# Patient Record
Sex: MALE | Race: WHITE | Employment: FULL TIME | URBAN - METROPOLITAN AREA
[De-identification: names, ages, dates, MRNs, and addresses within clinical notes are randomized per-mention and may not be internally consistent; named-entity substitution may affect disease eponyms.]

---

## 2018-09-26 ENCOUNTER — OP HISTORICAL/CONVERTED ENCOUNTER (OUTPATIENT)
Dept: OTHER | Age: 23
End: 2018-09-26

## 2018-09-29 ENCOUNTER — ED HISTORICAL/CONVERTED ENCOUNTER (OUTPATIENT)
Dept: OTHER | Age: 23
End: 2018-09-29

## 2019-02-16 ENCOUNTER — ED HISTORICAL/CONVERTED ENCOUNTER (OUTPATIENT)
Dept: OTHER | Age: 24
End: 2019-02-16

## 2021-09-20 ENCOUNTER — HOSPITAL ENCOUNTER (EMERGENCY)
Age: 26
Discharge: LEFT BEFORE DISCHARGE | End: 2021-09-20

## 2021-09-20 VITALS
RESPIRATION RATE: 18 BRPM | TEMPERATURE: 98.3 F | DIASTOLIC BLOOD PRESSURE: 83 MMHG | WEIGHT: 150 LBS | HEIGHT: 70 IN | HEART RATE: 81 BPM | SYSTOLIC BLOOD PRESSURE: 131 MMHG | BODY MASS INDEX: 21.47 KG/M2 | OXYGEN SATURATION: 99 %

## 2021-09-20 PROCEDURE — 75810000275 HC EMERGENCY DEPT VISIT NO LEVEL OF CARE

## 2021-09-20 RX ORDER — DIVALPROEX SODIUM 500 MG/1
500 TABLET, DELAYED RELEASE ORAL 3 TIMES DAILY
COMMUNITY

## 2021-09-20 RX ORDER — OXCARBAZEPINE 600 MG/1
600 TABLET ORAL DAILY
COMMUNITY

## 2021-09-20 NOTE — LETTER
[facility] EMERGENCY DEPARTMENT  [address] [facility] [address]  [phone]    Work/School Note    Date: 9/20/2021    To Whom It May concern:    Dimas Reza was at the emergency room by the following provider(s):  No providers found.           Sincerely,          Siva Hall RN

## 2021-09-20 NOTE — ED TRIAGE NOTES
Pt has hx of epilepsy. Hasn't taken meds for two days as he is waiting for meds to arrive in mail. Believes is vision problems is due to not taking seizure medication (Oxtellar). Pt c/o visual changes in both eyes. States he lost vision at work and now vision is blurry.

## 2022-05-31 ENCOUNTER — HOSPITAL ENCOUNTER (EMERGENCY)
Age: 27
Discharge: HOME | End: 2022-05-31
Attending: EMERGENCY MEDICINE
Payer: OTHER MISCELLANEOUS

## 2022-05-31 VITALS
HEIGHT: 70 IN | SYSTOLIC BLOOD PRESSURE: 119 MMHG | BODY MASS INDEX: 22.48 KG/M2 | WEIGHT: 157 LBS | DIASTOLIC BLOOD PRESSURE: 68 MMHG | HEART RATE: 68 BPM | RESPIRATION RATE: 19 BRPM | TEMPERATURE: 98.4 F | OXYGEN SATURATION: 98 %

## 2022-05-31 DIAGNOSIS — E86.0 DEHYDRATION: ICD-10-CM

## 2022-05-31 DIAGNOSIS — R55 NEAR SYNCOPE: Primary | ICD-10-CM

## 2022-05-31 LAB
ALBUMIN SERPL-MCNC: 3.3 G/DL (ref 3.5–5)
ALBUMIN/GLOB SERPL: 0.8 {RATIO} (ref 1.1–2.2)
ALP SERPL-CCNC: 68 U/L (ref 45–117)
ALT SERPL-CCNC: 14 U/L (ref 12–78)
ANION GAP SERPL CALC-SCNC: 8 MMOL/L (ref 5–15)
APPEARANCE UR: CLEAR
AST SERPL W P-5'-P-CCNC: 24 U/L (ref 15–37)
BASOPHILS # BLD: 0.1 K/UL (ref 0–0.2)
BASOPHILS NFR BLD: 1 % (ref 0–2.5)
BILIRUB SERPL-MCNC: 0.4 MG/DL (ref 0.2–1)
BILIRUB UR QL: NEGATIVE
BUN SERPL-MCNC: 13 MG/DL (ref 6–20)
BUN/CREAT SERPL: 19 (ref 12–20)
CA-I BLD-MCNC: 8.2 MG/DL (ref 8.5–10.1)
CHLORIDE SERPL-SCNC: 105 MMOL/L (ref 97–108)
CO2 SERPL-SCNC: 24 MMOL/L (ref 21–32)
COLOR UR: ABNORMAL
CREAT SERPL-MCNC: 0.69 MG/DL (ref 0.7–1.3)
EOSINOPHIL # BLD: 0.2 K/UL (ref 0–0.7)
EOSINOPHIL NFR BLD: 2 % (ref 0.9–2.9)
ERYTHROCYTE [DISTWIDTH] IN BLOOD BY AUTOMATED COUNT: 14 % (ref 11.5–14.5)
GLOBULIN SER CALC-MCNC: 3.9 G/DL (ref 2–4)
GLUCOSE SERPL-MCNC: 88 MG/DL (ref 65–100)
GLUCOSE UR STRIP.AUTO-MCNC: NEGATIVE MG/DL
HCT VFR BLD AUTO: 40.6 % (ref 41–53)
HGB BLD-MCNC: 13.5 G/DL (ref 13.5–17.5)
HGB UR QL STRIP: NEGATIVE
KETONES UR QL STRIP.AUTO: ABNORMAL MG/DL
LEUKOCYTE ESTERASE UR QL STRIP.AUTO: NEGATIVE
LYMPHOCYTES # BLD: 1.6 K/UL (ref 1–4.8)
LYMPHOCYTES NFR BLD: 15 % (ref 20.5–51.1)
MAGNESIUM SERPL-MCNC: 2 MG/DL (ref 1.6–2.4)
MCH RBC QN AUTO: 31.5 PG (ref 31–34)
MCHC RBC AUTO-ENTMCNC: 33.4 G/DL (ref 31–36)
MCV RBC AUTO: 94.4 FL (ref 80–100)
MONOCYTES # BLD: 0.6 K/UL (ref 0.2–2.4)
MONOCYTES NFR BLD: 6 % (ref 1.7–9.3)
NEUTS SEG # BLD: 7.9 K/UL (ref 1.8–7.7)
NEUTS SEG NFR BLD: 76 % (ref 42–75)
NITRITE UR QL STRIP.AUTO: NEGATIVE
NRBC # BLD: 0.01 K/UL
NRBC BLD-RTO: 0.1 PER 100 WBC
PH UR STRIP: 7.5 [PH] (ref 5–8)
PLATELET # BLD AUTO: 272 K/UL (ref 150–400)
PMV BLD AUTO: 9.9 FL (ref 6.5–11.5)
POTASSIUM SERPL-SCNC: 4.2 MMOL/L (ref 3.5–5.1)
PROT SERPL-MCNC: 7.2 G/DL (ref 6.4–8.2)
PROT UR STRIP-MCNC: NEGATIVE MG/DL
RBC # BLD AUTO: 4.3 M/UL (ref 4.5–5.9)
SODIUM SERPL-SCNC: 137 MMOL/L (ref 136–145)
SP GR UR REFRACTOMETRY: 1.01 (ref 1–1.03)
UROBILINOGEN UR QL STRIP.AUTO: 0.2 EU/DL (ref 0.2–1)
WBC # BLD AUTO: 10.4 K/UL (ref 4.4–11.3)

## 2022-05-31 PROCEDURE — 93005 ELECTROCARDIOGRAM TRACING: CPT

## 2022-05-31 PROCEDURE — 99284 EMERGENCY DEPT VISIT MOD MDM: CPT

## 2022-05-31 PROCEDURE — 36415 COLL VENOUS BLD VENIPUNCTURE: CPT

## 2022-05-31 PROCEDURE — 85025 COMPLETE CBC W/AUTO DIFF WBC: CPT

## 2022-05-31 PROCEDURE — 81003 URINALYSIS AUTO W/O SCOPE: CPT

## 2022-05-31 PROCEDURE — 83735 ASSAY OF MAGNESIUM: CPT

## 2022-05-31 PROCEDURE — 80053 COMPREHEN METABOLIC PANEL: CPT

## 2022-05-31 PROCEDURE — 74011250637 HC RX REV CODE- 250/637: Performed by: EMERGENCY MEDICINE

## 2022-05-31 PROCEDURE — 74011250636 HC RX REV CODE- 250/636: Performed by: EMERGENCY MEDICINE

## 2022-05-31 RX ORDER — ACETAMINOPHEN 500 MG
1000 TABLET ORAL
Status: COMPLETED | OUTPATIENT
Start: 2022-05-31 | End: 2022-05-31

## 2022-05-31 RX ORDER — SUMATRIPTAN 100 MG/1
1 TABLET, FILM COATED ORAL
COMMUNITY
Start: 2021-11-05

## 2022-05-31 RX ADMIN — SODIUM CHLORIDE 1000 ML: 9 INJECTION, SOLUTION INTRAVENOUS at 14:08

## 2022-05-31 RX ADMIN — ACETAMINOPHEN 1000 MG: 500 TABLET ORAL at 14:08

## 2022-05-31 NOTE — DISCHARGE INSTRUCTIONS
Stay hydrated and stay in a cool place for the rest of today. Rest at home. You should avoid prolonged exposure to warm places, stay cool. Return if worsening.

## 2022-05-31 NOTE — ED PROVIDER NOTES
EMERGENCY DEPARTMENT HISTORY AND PHYSICAL EXAM      Date: 5/31/2022  Patient Name: Danita Sloan      History of Presenting Illness     Chief Complaint   Patient presents with    Fatigue       History Provided By: Patient  Location/Duration/Severity/Modifying factors   Patient is a 29-year-old male with past medical history of seizures with vagal nerve stimulator and multiple medications. He presents today with complaints of an episode of near syncope. This happened while he was sitting in a hot van. He works at a [employer] facility and the patient evidently had been sitting in his [employer] without air conditioning for most of the day today. He had some water earlier but not much and he had a highly caffeinated energy drink earlier in the day. He describes having are as if he may have a seizure though he never developed a seizure. He felt as if he was about to pass out but never truly passed out. There is no tongue biting or urinary incontinence. He has a very mild headache but denies any significant headache no vomiting, diarrhea but did feel somewhat nauseous. He reports he feels much better now. He has not had a seizure in over a year. Otherwise he has been in his usual state of health. There are no other complaints, changes, or physical findings at this time. PCP: None    Current Outpatient Medications   Medication Sig Dispense Refill    SUMAtriptan (IMITREX) 100 mg tablet 1 Tablet.  OXcarbazepine (Oxtellar XR) 600 mg Tb24 Take 600 mg by mouth daily.  divalproex DR (Depakote) 500 mg tablet Take 500 mg by mouth three (3) times daily.  perampaneL (Fycompa) 4 mg tab tablet Take 4 mg by mouth nightly. Past History     Past Medical History:  Past Medical History:   Diagnosis Date    Seizures [facility])        Past Surgical History:  No past surgical history on file. Family History:  History reviewed. No pertinent family history.     Social History:  Social History Tobacco Use    Smoking status: Current Every Day Smoker     Packs/day: 1.00    Smokeless tobacco: Never Used   Substance Use Topics    Alcohol use: Never    Drug use: Yes     Types: Marijuana       Allergies: Allergies   Allergen Reactions    Dilantin [Phenytoin Sodium Extended] Seizures    Keppra [Levetiracetam] Seizures         Review of Systems     Review of Systems   Constitutional: Negative for chills and fever. HENT: Negative for congestion and rhinorrhea. Eyes: Negative for visual disturbance. Respiratory: Negative for cough and shortness of breath. Cardiovascular: Negative for chest pain. Gastrointestinal: Positive for nausea. Negative for abdominal pain, diarrhea and vomiting. Genitourinary: Negative for dysuria, frequency and urgency. Musculoskeletal: Negative for myalgias. Skin: Negative for rash. Neurological: Positive for light-headedness and headaches. Physical Exam     Physical Exam  Constitutional:       General: He is not in acute distress. Appearance: Normal appearance. He is normal weight. He is not ill-appearing or toxic-appearing. Comments: Tired appearing, nontoxic   HENT:      Head: Normocephalic and atraumatic. Right Ear: External ear normal.      Left Ear: External ear normal.      Nose: Nose normal. No congestion or rhinorrhea. Mouth/Throat:      Mouth: Mucous membranes are dry. Pharynx: No oropharyngeal exudate or posterior oropharyngeal erythema. Eyes:      Conjunctiva/sclera: Conjunctivae normal.      Pupils: Pupils are equal, round, and reactive to light. Cardiovascular:      Rate and Rhythm: Normal rate and regular rhythm. Pulses: Normal pulses. Heart sounds: Normal heart sounds. No murmur heard. No friction rub. Pulmonary:      Effort: Pulmonary effort is normal.      Breath sounds: Normal breath sounds. No wheezing, rhonchi or rales. Abdominal:      General: Abdomen is flat. Tenderness:  There is no abdominal tenderness. There is no guarding or rebound. Musculoskeletal:         General: No swelling or tenderness. Normal range of motion. Cervical back: Normal range of motion and neck supple. No rigidity or tenderness. Right lower leg: No edema. Left lower leg: No edema. Skin:     General: Skin is warm and dry. Capillary Refill: Capillary refill takes less than 2 seconds. Neurological:      General: No focal deficit present. Mental Status: He is alert. Cranial Nerves: No cranial nerve deficit. Sensory: No sensory deficit. Motor: No weakness. Lab and Diagnostic Study Results     Labs -  Recent Results (from the past 24 hour(s))   EKG, 12 LEAD, INITIAL    Collection Time: 05/31/22  1:05 PM   Result Value Ref Range    Ventricular Rate 66 BPM    Atrial Rate 66 BPM    P-R Interval 141 ms    QRS Duration 90 ms    Q-T Interval 371 ms    QTC Calculation (Bezet) 389 ms    Calculated P Axis 58 degrees    Calculated R Axis 45 degrees    Calculated T Axis 38 degrees    Diagnosis Sinus rhythm    CBC WITH AUTOMATED DIFF    Collection Time: 05/31/22  2:14 PM   Result Value Ref Range    WBC 10.4 4.4 - 11.3 K/uL    RBC 4.30 (L) 4.50 - 5.90 M/uL    HGB 13.5 13.5 - 17.5 g/dL    HCT 40.6 (L) 41 - 53 %    MCV 94.4 80 - 100 FL    MCH 31.5 31 - 34 PG    MCHC 33.4 31.0 - 36.0 g/dL    RDW 14.0 11.5 - 14.5 %    PLATELET 334 251 - 604 K/uL    MPV 9.9 6.5 - 11.5 FL    NRBC 0.1  WBC    ABSOLUTE NRBC 0.01 K/uL    NEUTROPHILS 76 (H) 42 - 75 %    LYMPHOCYTES 15 (L) 20.5 - 51.1 %    MONOCYTES 6 1.7 - 9.3 %    EOSINOPHILS 2 0.9 - 2.9 %    BASOPHILS 1 0.0 - 2.5 %    ABS. NEUTROPHILS 7.9 (H) 1.8 - 7.7 K/UL    ABS. LYMPHOCYTES 1.6 1.0 - 4.8 K/UL    ABS. MONOCYTES 0.6 0.2 - 2.4 K/UL    ABS. EOSINOPHILS 0.2 0.0 - 0.7 K/UL    ABS.  BASOPHILS 0.1 0.0 - 0.2 K/UL   METABOLIC PANEL, COMPREHENSIVE    Collection Time: 05/31/22  2:14 PM   Result Value Ref Range    Sodium 137 136 - 145 mmol/L Potassium 4.2 3.5 - 5.1 mmol/L    Chloride 105 97 - 108 mmol/L    CO2 24 21 - 32 mmol/L    Anion gap 8 5 - 15 mmol/L    Glucose 88 65 - 100 mg/dL    BUN 13 6 - 20 mg/dL    Creatinine 0.69 (L) 0.70 - 1.30 mg/dL    BUN/Creatinine ratio 19 12 - 20      GFR est AA >60 >60 ml/min/1.73m2    GFR est non-AA >60 >60 ml/min/1.73m2    Calcium 8.2 (L) 8.5 - 10.1 mg/dL    Bilirubin, total 0.4 0.2 - 1.0 mg/dL    AST (SGOT) 24 15 - 37 U/L    ALT (SGPT) 14 12 - 78 U/L    Alk. phosphatase 68 45 - 117 U/L    Protein, total 7.2 6.4 - 8.2 g/dL    Albumin 3.3 (L) 3.5 - 5.0 g/dL    Globulin 3.9 2.0 - 4.0 g/dL    A-G Ratio 0.8 (L) 1.1 - 2.2     MAGNESIUM    Collection Time: 05/31/22  2:14 PM   Result Value Ref Range    Magnesium 2.0 1.6 - 2.4 mg/dL   URINALYSIS W/ RFLX MICROSCOPIC    Collection Time: 05/31/22  2:14 PM   Result Value Ref Range    Color Yellow/Straw      Appearance Clear Clear      Specific gravity 1.015 1.003 - 1.030      pH (UA) 7.5 5.0 - 8.0      Protein Negative Negative mg/dL    Glucose Negative Negative mg/dL    Ketone Trace (A) Negative mg/dL    Bilirubin Negative Negative      Blood Negative Negative      Urobilinogen 0.2 0.2 - 1.0 EU/dL    Nitrites Negative Negative      Leukocyte Esterase Negative Negative           Radiologic Studies -   No orders to display         Medical Decision Making and ED Course   - I am the first and primary provider for this patient AND AM THE PRIMARY PROVIDER OF RECORD. - I reviewed the vital signs, available nursing notes, past medical history, past surgical history, family history and social history. - Initial assessment performed. The patients presenting problems have been discussed, and the staff are in agreement with the care plan formulated and outlined with them. I have encouraged them to ask questions as they arise throughout their visit. Vital Signs-Reviewed the patient's vital signs.     Patient Vitals for the past 24 hrs:   Temp Pulse Resp BP SpO2   05/31/22 1525 98.4 °F (36.9 °C) 68 19 119/68 98 %   05/31/22 1254 97.8 °F (36.6 °C) 69 18 110/69 98 %         EKG interpretation: (Preliminary): See ED course for my interpretation of EKG(s). Records Reviewed: Nursing Notes and Old Medical Records    ED Course:     ED Course as of 05/31/22 2149   Tue May 31, 2022   1351 Normal sinus rhythm, rate of 66. Normal AK, QRS and QTc intervals. Normal axis. No ST segment elevation or depression. Overall normal sinus rhythm and normal EKG. [ALEKSANDAR]      ED Course User Index  [ALEKSANDAR] Jessica Rosales DO       Provider Notes (Medical Decision Making):     MDM  Number of Diagnoses or Management Options  Dehydration  Near syncope  Diagnosis management comments: 26-year male present with near syncope. Differential for syncope would include vasovagal syncope, orthostasis, dehydration, arrhythmia such as Brugada, Leann-Parkinson-White, arrhythmogenic right ventricular dysplasia, PE, anemia, etc.  The patient's EKG is reassuring there is no evidence of any arrhythmia or prearrhythmic state. Likewise PE is very unlikely given patient is not hypoxic nor tachycardic. Not tachypneic no risk factors. Patient feels better after fluid administration. Advised to stay cool and to keep himself hydrated for the next few days. Advised in the interim if he feels worse he should return. Suspect syncope or near syncope as opposed to seizure. Recommended to follow-up with his PCP and return if he is feeling worse in any way in the meantime. At this time, patient is stable and appropriate for discharge home.  Patient demonstrates understanding of current diagnoses and is in agreement with the treatment plan. Noemi Douglas are advised that while the likelihood of serious underlying condition is low at this point given the evaluation performed today, we cannot fully rule it out. Mabelene Douglas are advised to immediately return with any new symptoms or worsening of current condition.  Any Incidental findings were noted on the patient's discharge paperwork as well as verbally directly to the patient, and the appropriate follow-up was given to the patient as far as instructions on testing needed as well as the timeframe.  All questions have been answered. Socorro Hernandez is given educational material regarding their diagnoses, including danger symptoms and when to return to the ED. This note was dictated utilizing Dragon voice recognition software. Unfortunately this leads to occasional typographical errors. I apologize in advance if the situation occurs. If questions occur please do not hesitate to contact me directly. Omero Howell DO             Consultations:   Consultations: - NONE        Procedures and Critical Care   Performed by: Omero Howell DO  Procedures     Any procedures performed were performed directly by me unless otherwise noted. Any assistants in the procedure excluding nursing staff will be noted as above. CRITICAL CARE NOTE : . none      Omero Howell DO      Diagnosis:   1. Near syncope    2. Dehydration          Disposition: Discharged    Follow-up Information     Follow up With Specialties Details Why [address] [facility] EMERGENCY DEPT Emergency Medicine  As needed, If symptoms worsen; or [facility] Emergency Department [address]  [address] [facility] [address]    Your Primary Care Physician  In 3 days            Discharge Medication List as of 5/31/2022  3:33 PM      CONTINUE these medications which have NOT CHANGED    Details   SUMAtriptan (IMITREX) 100 mg tablet 1 Tablet., Historical Med      OXcarbazepine (Oxtellar XR) 600 mg Tb24 Take 600 mg by mouth daily. , Historical Med      divalproex DR (Depakote) 500 mg tablet Take 500 mg by mouth three (3) times daily. , Historical Med      perampaneL (Fycompa) 4 mg tab tablet Take 4 mg by mouth nightly., Historical Med             Patient seen in the context of the Novel Coronavirus (COVID19) pandemic, utilizing contemporary protocols and evidence based on the most up to date available evidence, understanding that the current evidence has the potential to change as additional information becomes available. This note is dictated utilizing Dragon voice recognition software. Unfortunately this leads to occasional typographical errors using the voice recognition. I apologize in advance if the situation occurs. If questions occur please do not hesitate to contact me directly.     Coretta Muse, DO

## 2022-05-31 NOTE — ED TRIAGE NOTES
Works at [employer] and was sitting in [facility] in heat, started feeling weak, nauseated, EMS started IV 20g Left AC, and gave 200 ml NS, did not eat breakfast,

## 2022-06-01 LAB
ATRIAL RATE: 66 BPM
CALCULATED P AXIS, ECG09: 58 DEGREES
CALCULATED R AXIS, ECG10: 45 DEGREES
CALCULATED T AXIS, ECG11: 38 DEGREES
DIAGNOSIS, 93000: NORMAL
P-R INTERVAL, ECG05: 141 MS
Q-T INTERVAL, ECG07: 371 MS
QRS DURATION, ECG06: 90 MS
QTC CALCULATION (BEZET), ECG08: 389 MS
VENTRICULAR RATE, ECG03: 66 BPM

## 2022-11-17 ENCOUNTER — HOSPITAL ENCOUNTER (EMERGENCY)
Age: 27
Discharge: HOME | End: 2022-11-18
Attending: EMERGENCY MEDICINE
Payer: COMMERCIAL

## 2022-11-17 VITALS
RESPIRATION RATE: 16 BRPM | SYSTOLIC BLOOD PRESSURE: 161 MMHG | OXYGEN SATURATION: 100 % | WEIGHT: 165 LBS | HEART RATE: 74 BPM | DIASTOLIC BLOOD PRESSURE: 90 MMHG | HEIGHT: 70 IN | BODY MASS INDEX: 23.62 KG/M2 | TEMPERATURE: 96.9 F

## 2022-11-17 DIAGNOSIS — R29.818 AURA: Primary | ICD-10-CM

## 2022-11-17 LAB
ALBUMIN SERPL-MCNC: 3.9 G/DL (ref 3.5–5)
ALBUMIN/GLOB SERPL: 1 {RATIO} (ref 1.1–2.2)
ALP SERPL-CCNC: 71 U/L (ref 45–117)
ALT SERPL-CCNC: 13 U/L (ref 12–78)
ANION GAP SERPL CALC-SCNC: 9 MMOL/L (ref 5–15)
AST SERPL W P-5'-P-CCNC: 14 U/L (ref 15–37)
BASOPHILS # BLD: 0 K/UL (ref 0–0.2)
BASOPHILS NFR BLD: 1 % (ref 0–2.5)
BILIRUB SERPL-MCNC: 0.2 MG/DL (ref 0.2–1)
BUN SERPL-MCNC: 15 MG/DL (ref 6–20)
BUN/CREAT SERPL: 14 (ref 12–20)
CA-I BLD-MCNC: 8.9 MG/DL (ref 8.5–10.1)
CHLORIDE SERPL-SCNC: 99 MMOL/L (ref 97–108)
CO2 SERPL-SCNC: 28 MMOL/L (ref 21–32)
CREAT SERPL-MCNC: 1.11 MG/DL (ref 0.7–1.3)
EOSINOPHIL # BLD: 0.1 K/UL (ref 0–0.7)
EOSINOPHIL NFR BLD: 1 % (ref 0.9–2.9)
ERYTHROCYTE [DISTWIDTH] IN BLOOD BY AUTOMATED COUNT: 12.8 % (ref 11.5–14.5)
GLOBULIN SER CALC-MCNC: 4.1 G/DL (ref 2–4)
GLUCOSE SERPL-MCNC: 90 MG/DL (ref 65–100)
HCT VFR BLD AUTO: 43.2 % (ref 41–53)
HGB BLD-MCNC: 14.7 G/DL (ref 13.5–17.5)
LYMPHOCYTES # BLD: 2 K/UL (ref 1–4.8)
LYMPHOCYTES NFR BLD: 26 % (ref 20.5–51.1)
MCH RBC QN AUTO: 31.7 PG (ref 31–34)
MCHC RBC AUTO-ENTMCNC: 34.1 G/DL (ref 31–36)
MCV RBC AUTO: 93.2 FL (ref 80–100)
MONOCYTES # BLD: 0.7 K/UL (ref 0.2–2.4)
MONOCYTES NFR BLD: 9 % (ref 1.7–9.3)
NEUTS SEG # BLD: 4.8 K/UL (ref 1.8–7.7)
NEUTS SEG NFR BLD: 63 % (ref 42–75)
NRBC # BLD: 0.02 K/UL
NRBC BLD-RTO: 0.2 PER 100 WBC
PLATELET # BLD AUTO: 312 K/UL (ref 150–400)
PMV BLD AUTO: 9.4 FL (ref 6.5–11.5)
POTASSIUM SERPL-SCNC: 4.1 MMOL/L (ref 3.5–5.1)
PROT SERPL-MCNC: 8 G/DL (ref 6.4–8.2)
RBC # BLD AUTO: 4.64 M/UL (ref 4.5–5.9)
SODIUM SERPL-SCNC: 136 MMOL/L (ref 136–145)
VALPROATE SERPL-MCNC: 88 UG/ML (ref 50–100)
WBC # BLD AUTO: 7.6 K/UL (ref 4.4–11.3)

## 2022-11-17 PROCEDURE — 85025 COMPLETE CBC W/AUTO DIFF WBC: CPT

## 2022-11-17 PROCEDURE — 80164 ASSAY DIPROPYLACETIC ACD TOT: CPT

## 2022-11-17 PROCEDURE — 74011250636 HC RX REV CODE- 250/636: Performed by: EMERGENCY MEDICINE

## 2022-11-17 PROCEDURE — 80053 COMPREHEN METABOLIC PANEL: CPT

## 2022-11-17 PROCEDURE — 36415 COLL VENOUS BLD VENIPUNCTURE: CPT

## 2022-11-17 PROCEDURE — 99284 EMERGENCY DEPT VISIT MOD MDM: CPT

## 2022-11-17 RX ADMIN — SODIUM CHLORIDE 1000 ML: 9 INJECTION, SOLUTION INTRAVENOUS at 23:25

## 2022-11-17 NOTE — Clinical Note
[address]  [facility] EMERGENCY DEPT  [address]  [place] [id number] [phone]    Work/School Note    Date: 11/17/2022    To Whom It May concern:    Lana Carrion was seen and treated today in the emergency room by the following provider(s):  Attending Provider: Ousmane Barclay MD.      Lana Carrion is excused from work/school on 11/18/22 and 11/19/22. He is medically clear to return to work/school on 11/20/2022.        Sincerely,          Rohit Cardenas MD

## 2022-11-18 NOTE — ED PROVIDER NOTES
EMERGENCY DEPARTMENT HISTORY AND PHYSICAL EXAM  ?    Date: 11/17/2022  Patient Name: Miguel Barker    History of Presenting Illness    Patient presents with:  Dizziness: Patient states around 1700 he started to have an aura like he was going to have a seizure. Pt has hx of seizures. Pt felt lightheaded, dizzy, and nauseous. Pt states he might have had some seizure activity. History Provided By: Patient    HPI: Miguel Barker, 32 y.o. male with a past medical history significant for petit mal and grand mal seizure presents to the ED with cc of sensation that felt like an aura. 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 There are no other complaints, changes, or physical findings at this time. PCP: None    No current facility-administered medications on file prior to encounter. Current Outpatient Medications on File Prior to Encounter:  SUMAtriptan (IMITREX) 100 mg tablet, 1 Tablet., Disp: , Rfl:   OXcarbazepine (Oxtellar XR) 600 mg Tb24, Take 600 mg by mouth daily. , Disp: , Rfl:   divalproex DR (Depakote) 500 mg tablet, Take 500 mg by mouth three (3) times daily. , Disp: , Rfl:   perampaneL (Fycompa) 4 mg tab tablet, Take 4 mg by mouth nightly., Disp: , Rfl:         Past History    Past Medical History:  Past Medical History:  No date: Seizures ([address]    Past Surgical History:  Past Surgical History:  No date: IR IMPLANT SPINE NEURSTIM LEAD, GEN W FLU GDE      Comment:  vagus nerve stimulator    Family History:  History reviewed. No pertinent family history.       Social History:  Social History    Tobacco Use      Smoking status: Every Day        Packs/day: 1.00        Types: Cigarettes      Smokeless tobacco: Never    Vaping Use      Vaping Use: Some days    Alcohol use: Yes      Comment: occsionally    Drug use: Yes      Types: Marijuana      Allergies:   -- Dilantin [Phenytoin Sodium Extended] -- Seizures   -- Keppra [Levetiracetam] -- Seizures      Review of Systems  @[facility]@    Physical Exam  [unfilled]    Diagnostic Study Results    Labs -   No results found for this or any previous visit (from the past 12 hour(s)). Radiologic Studies -   No orders to display  CT Results  (Last 48 hours)    None      CXR Results  (Last 48 hours)    None          Medical Decision Making  I am the first provider for this patient. I reviewed the vital signs, available nursing notes, past medical history, past surgical history, family history and social history. Vital Signs-Reviewed the patient's vital signs. Empty flowsheet group. Records Reviewed: Nursing Notes    Provider Notes (Medical Decision Making):   Observe for further seizure activity. ED Course:   Valproic acid level is 88. No further seizure activity in the ED. Initial assessment performed. The patients presenting problems have been discussed, and they are in agreement with the care plan formulated and outlined with them. I have encouraged them to ask questions as they arise throughout their visit. PLAN:  1. Current Discharge Medication List      2. Follow-up Information    None     Return to ED if worse     Diagnosis    Clinical Impression: aura  ? Past Medical History:   Diagnosis Date    Seizures [facility])        Past Surgical History:   Procedure Laterality Date    IR IMPLANT SPINE NEURSTIM LEAD, GEN W FLU GDE      vagus nerve stimulator         History reviewed. No pertinent family history.     Social History     Socioeconomic History    Marital status:      Spouse name: Not on file    Number of children: Not on file    Years of education: Not on file    Highest education level: Not on file   Occupational History    Not on file   Tobacco Use    Smoking status: Every Day     Packs/day: 1.00     Types: Cigarettes    Smokeless tobacco: Never   Vaping Use    Vaping Use: Some days   Substance and Sexual Activity    Alcohol use: Yes     Comment: occsionally    Drug use: Yes     Types: Marijuana    Sexual activity: Not on file   Other Topics Concern    Not on file   Social History Narrative    Not on file     Social Determinants of Health     Financial Resource Strain: Not on file   Food Insecurity: Not on file   Transportation Needs: Not on file   Physical Activity: Not on file   Stress: Not on file   Social Connections: Not on file   Intimate Partner Violence: Not on file   Housing Stability: Not on file         ALLERGIES: Dilantin [phenytoin sodium extended] and Keppra [levetiracetam]    Review of Systems   Constitutional: Negative. HENT: Negative. Eyes: Negative. Respiratory: Negative. Cardiovascular: Negative. Gastrointestinal: Negative. Endocrine: Negative. Genitourinary: Negative. Musculoskeletal: Negative. Allergic/Immunologic: Negative. Neurological:  Positive for dizziness. Hematological: Negative. Psychiatric/Behavioral: Negative. Vitals:    11/17/22 2246   BP: (!) 161/90   Pulse: 74   Resp: 16   Temp: 96.9 °F (36.1 °C)   SpO2: 100%   Weight: 74.8 kg (165 lb)   Height: 5' 10\" (1.778 m)            Physical Exam  Vitals and nursing note reviewed. Constitutional:       Appearance: Normal appearance. He is normal weight. HENT:      Head: Normocephalic and atraumatic. Right Ear: Tympanic membrane, ear canal and external ear normal.      Left Ear: Tympanic membrane, ear canal and external ear normal.      Nose: Nose normal.      Mouth/Throat:      Mouth: Mucous membranes are moist.      Pharynx: Oropharynx is clear. Eyes:      Extraocular Movements: Extraocular movements intact. Conjunctiva/sclera: Conjunctivae normal.      Pupils: Pupils are equal, round, and reactive to light. Cardiovascular:      Rate and Rhythm: Normal rate and regular rhythm. Pulses: Normal pulses. Heart sounds: Normal heart sounds.    Pulmonary:      Effort: Pulmonary effort is normal.      Breath sounds: Normal breath sounds. Abdominal:      General: Abdomen is flat. Bowel sounds are normal.      Palpations: Abdomen is soft. Musculoskeletal:         General: Normal range of motion. Cervical back: Normal range of motion and neck supple. Skin:     General: Skin is warm and dry. Neurological:      General: No focal deficit present. Mental Status: He is alert and oriented to person, place, and time.    Psychiatric:         Mood and Affect: Mood normal.         Behavior: Behavior normal.        MDM  Risk of Complications, Morbidity, and/or Mortality  Presenting problems: moderate  Diagnostic procedures: moderate  Management options: moderate    Patient Progress  Patient progress: stable         Procedures

## 2023-05-18 RX ORDER — DIVALPROEX SODIUM 500 MG/1
500 TABLET, DELAYED RELEASE ORAL 3 TIMES DAILY
COMMUNITY

## 2023-05-18 RX ORDER — SUMATRIPTAN 100 MG/1
1 TABLET, FILM COATED ORAL
COMMUNITY
Start: 2021-11-05

## 2023-05-18 RX ORDER — OXCARBAZEPINE 600 MG/1
600 TABLET ORAL DAILY
COMMUNITY